# Patient Record
Sex: FEMALE | Race: WHITE | Employment: FULL TIME | URBAN - METROPOLITAN AREA
[De-identification: names, ages, dates, MRNs, and addresses within clinical notes are randomized per-mention and may not be internally consistent; named-entity substitution may affect disease eponyms.]

---

## 2017-04-05 ENCOUNTER — HOSPITAL ENCOUNTER (OUTPATIENT)
Dept: PHYSICAL THERAPY | Age: 27
Discharge: HOME | End: 2017-04-05
Payer: COMMERCIAL

## 2017-04-05 PROCEDURE — 97162 PT EVAL MOD COMPLEX 30 MIN: CPT

## 2017-04-05 PROCEDURE — 97014 ELECTRIC STIMULATION THERAPY: CPT

## 2017-04-05 PROCEDURE — 97110 THERAPEUTIC EXERCISES: CPT

## 2017-04-05 NOTE — PROGRESS NOTES
PHYSICAL THERAPY - DAILY TREATMENT NOTE    Patient Name: Earl Livingston        Date: 2017  : 1990   YES Patient  Verified  Visit #:     Insurance: Payor: Gildardo Sites / Plan: Margarita Pacheco PPO / Product Type: PPO /      In time: 8:22 Out time: 9:15   Total Treatment Time: 53     TREATMENT AREA =  Right knee pain [M25.561]  Right patellofemoral syndrome [M22.2X1]    SUBJECTIVE  Pain Level (on 0 to 10 scale):  6  / 10   Medication Changes/New allergies or changes in medical history, any new surgeries or procedures? NO    If yes, update Summary List   Subjective Functional Status/Changes:  []  No changes reported     See medical history          OBJECTIVE  Modalities Rationale:     decrease edema, decrease inflammation, decrease pain and increase tissue extensibility to improve patient's ability to walk  10 min [x] Estim, type/location: IFC/ R knee                                     []  att     [x]  unatt     []  w/US     [x]  w/ice    []  w/heat    min []  Mechanical Traction: type/lbs                   []  pro   []  sup   []  int   []  cont    []  before manual    []  after manual    min []  Ultrasound, settings/location:      min []  Iontophoresis w/ dexamethasone, location:                                               []  take home patch       []  in clinic    min []  Ice     []  Heat    location/position:     min []  Vasopneumatic Device, press/temp:     min []  Other:    [x] Skin assessment post-treatment (if applicable):    [x]  intact    []  redness- no adverse reaction     []redness - adverse reaction:          15 min Patient Education:  YES  Reviewed HEP/POC/Goals   []  Progressed/Changed HEP based on:        Other Objective/Functional Measures:    See POC     Post Treatment Pain Level (on 0 to 10) scale:   5  / 10     ASSESSMENT  Assessment/Changes in Function:     Justification for Eval Code Complexity:  Patient History : R MCL strain  Examination see exam   Clinical Presentation: evolving  Clinical Decision Making : FOTO : 26 /100       []  See Progress Note/Recertification   Patient will continue to benefit from skilled PT services to modify and progress therapeutic interventions, address functional mobility deficits, address ROM deficits, address strength deficits, analyze and address soft tissue restrictions, analyze and modify body mechanics/ergonomics and instruct in home and community integration to attain remaining goals. Progress toward goals / Updated goals:    Initial evaluation completed with home exercise program, IFC and education initiated. PLAN  [x]  Upgrade activities as tolerated YES Continue plan of care   []  Discharge due to :    []  Other:      Therapist: Gurvinder Phillips PT    Date: 4/5/2017 Time: 8:22 AM     No future appointments.

## 2017-04-05 NOTE — PROGRESS NOTES
[address] [facility]  THERAPY AT [address] [facility], [address] [facility] [address] - Phone: [phone]  Fax: [phone] / [address] [facility]  Patient Name: Daryll Carrel : 1990   Medical   Diagnosis: Right knee pain [M25.561]  Right patellofemoral syndrome [M22.2X1] Treatment Diagnosis: Right knee pain [M25.561]  Right patellofemoral syndrome [M22.2X1]   Onset Date: 2017     Referral Source: Angeles Guy MD [facility]): 2017   Prior Hospitalization: See medical history Provider #: [id number]   Prior Level of Function: No knee pain with walking or ADLs   Comorbidities: Hx R MCL strain   Medications: Verified on Patient Summary List   The Plan of Care and following information is based on the information from the initial evaluation.   ==================================================================================  Assessment / key information:  Patient is a 27 y.o. female who presents to [facility] with diagnosis of Right knee pain [M25.561]  Right patellofemoral syndrome [M22.2X1]. Patient reports onset of R patella pain 2017 when she slipped and hyperextended her knee. She reports having an MRI which was negative except for patellofemoral syndrome. Patient's pain level is constant and ranges from 4/10 to 9/10. Pain  Increases with standing and stairs, decreases with elevation and hot/cold. Pain does awaken her at night. Upon objective evaluation patient presents with impaired and painful AROM of R knee, impaired strength in knee flexion/extension and hip flexion, edema of 2 cm infrapatella, patella hypomobility  and decreased flexibility of  quad/hamstring muscles. Patient ambulates with SC outdoors, no assistive device indoors demonstrating gait deviation of antalgia. AROM of R knee as follows:  -5 to 106 degrees.   Patient scored 26 on FOTO indicating decreased function and quality of life. Functional limitations include sleeping, standing, walking, stairs and lifting/carrying her toddler. .  Patient can benefit from skilled PT to increase ROM, flexibility and strength, decrease pain and inflammation to improve overall function and quality of life.  ==================================================================================  Eval Complexity: History: MEDIUM  Complexity : 1-2 comorbidities / personal factors will impact the outcome/ POC Exam:HIGH Complexity : 4+ Standardized tests and measures addressing body structure, function, activity limitation and / or participation in recreation  Presentation: MEDIUM Complexity : Evolving with changing characteristics  Clinical Decision Making:MEDIUM Complexity : FOTO score of 26-74Overall Complexity:MEDIUM  Problem List: pain affecting function, decrease ROM, decrease strength, edema affecting function, impaired gait/ balance, decrease ADL/ functional abilitiies, decrease activity tolerance, decrease flexibility/ joint mobility. Treatment Plan may include any combination of the following: Therapeutic exercise, Therapeutic activities, Neuromuscular re-education, Physical agent/modality, Gait/balance training, Manual therapy and Patient education  Patient / Family readiness to learn indicated by: asking questions, trying to perform skills and interest  Persons(s) to be included in education: patient (P)  Barriers to Learning/Limitations: None  Measures taken:    Patient Goal (s): \"To relieve pain in my knee and walk without needing a cane. \"   Patient self reported health status: good  Rehabilitation Potential: good   Short Term Goals: To be accomplished in 3  weeks:  1) Patient performing daily home exercise program.. 2) Increase FOTO to 41 indicating improved function and quality of life. 3) Increase AROM in R knee to 0-120 degrees to facilitate walking and stairs.  Long Term Goals:  To be accomplished in  6  weeks:  1) Patient  independent with HEP. 2) Patient will improve R knee AROM to 0-130 degrees to facilitate walking and stairs. 3) Increase FOTO to 53 indicating improved function and quality of life. 4) Patient able to walk community distances without assistive device. Frequency / Duration:   Patient to be seen  2  times per week for 6  weeks:  Patient / Caregiver education and instruction: activity modification, exercises and other taping, ice  G-Codes (GP): meggan    Therapist Signature: Tammy Mehta PT Date: 3/0/3246   Certification Period: na Time: 9:20 AM   ==================================================================================  I certify that the above Physical Therapy Services are being furnished while the patient is under my care. I agree with the treatment plan and certify that this therapy is necessary. Physician Signature:        Date:       Time:     Please sign and return to [facility] or you may fax the signed copy to [phone]. Thank you.

## 2017-04-06 ENCOUNTER — HOSPITAL ENCOUNTER (OUTPATIENT)
Dept: PHYSICAL THERAPY | Age: 27
Discharge: HOME | End: 2017-04-06
Payer: COMMERCIAL

## 2017-04-06 PROCEDURE — 97014 ELECTRIC STIMULATION THERAPY: CPT

## 2017-04-06 PROCEDURE — 97110 THERAPEUTIC EXERCISES: CPT

## 2017-04-06 NOTE — PROGRESS NOTES
PHYSICAL THERAPY - DAILY TREATMENT NOTE    Patient Name: Igor Miranda        Date: 2017  : 1990   YES Patient  Verified  Visit #:   2   of   12  Insurance: Payor: Grisel Stone / Plan: Grace Ocasio PPO / Product Type: PPO /      In time: 8:51 Out time: 9:36   Total Treatment Time: 45     Medicare Time Tracking (below)   Total Timed Codes (min):  n/a 1:1 Treatment Time:  n/a     TREATMENT AREA =  Right knee pain [M25.561]  Right patellofemoral syndrome [M22.2X1]    SUBJECTIVE  Pain Level (on 0 to 10 scale):  5  / 10   Medication Changes/New allergies or changes in medical history, any new surgeries or procedures? NO    If yes, update Summary List   Subjective Functional Status/Changes:  []  No changes reported     Pt states \"im a little sore today, ill be honest i didn't do the homework she gave me bci got a flat tire and wasn't in the mood. Its a stiff sore pain today. Tj Winston been concentrating on not limping \"       OBJECTIVE  Modalities Rationale:     decrease pain to improve patient's ability to perform ADLs.     10 min [x] Estim, type/location: IFC to R knee, (Pt requested [facility], however, preferred [facility] for further f/u visits)                                    []  att     [x]  unatt     []  w/US     []  w/ice    [x]  w/heat    min []  Mechanical Traction: type/lbs                   []  pro   []  sup   []  int   []  cont    []  before manual    []  after manual    min []  Ultrasound, settings/location:      min []  Iontophoresis w/ dexamethasone, location:                                               []  take home patch       []  in clinic    min []  Ice     []  Heat    location/position:     min []  Vasopneumatic Device, press/temp:     min []  Other:    [x] Skin assessment post-treatment (if applicable):    [x]  intact    []  redness- no adverse reaction     []redness - adverse reaction:        35 min Therapeutic Exercise:  [x]  See flow sheet   Rationale:      increase ROM and increase strength to improve the patients ability to tolerate prolonged standing and walking     min Patient Education:  YES  Reviewed HEP   []  Progressed/Changed HEP based on: Other Objective/Functional Measures:    Reports R knee clicking with release of QS, increased towel roll underneath which improved sx. R Knee ext AROM = -7deg     Post Treatment Pain Level (on 0 to 10) scale:   5  / 10     ASSESSMENT  Assessment/Changes in Function:     Presented with antalgic gait pattern and decreased R knee extension upon heel strike with ambulation. Demonstrated palpable clicking with QS and SAQ. Demonstrates increased R knee sx without towel when straightening R knee. Updated and issued current HEP, however, instructed patient to not perform hamstring stretch at home secondary to incr R knee sx with stretch. []  See Progress Note/Recertification   Patient will continue to benefit from skilled PT services to modify and progress therapeutic interventions, address functional mobility deficits, address ROM deficits, address strength deficits, analyze and address soft tissue restrictions, analyze and cue movement patterns, analyze and modify body mechanics/ergonomics, assess and modify postural abnormalities, address imbalance/dizziness and instruct in home and community integration to attain remaining goals. Progress toward goals / Updated goals:    First visit after initial evaluation. Progress tx per POC.         PLAN  [x]  Upgrade activities as tolerated YES Continue plan of care   []  Discharge due to :    []  Other:      Therapist: Misty Diaz    Date: 4/6/2017 Time: 8:51 AM     Future Appointments  Date Time Provider Lazaro Powers   4/6/2017 9:00 AM Pura Crespo [address] [place] [address]   4/11/2017 3:30 PM Radha Melara, PT [address]   4/13/2017 9:00 AM Rajinder De Leon PTA [facility]   4/17/2017 8:00 AM [facility]   4/21/2017 9:00 AM Rajinder De Leon PTA [address]

## 2017-04-10 NOTE — PROGRESS NOTES
PHYSICAL THERAPY - DAILY TREATMENT NOTE    Patient Name: Deni Quezada        Date: 2017  : 1990   YES Patient  Verified  Visit #:   3   of   12  Insurance: Payor: Juan Antonio Law / Plan: Damaris Fitting PPO / Product Type: PPO /      In time: 7:55am Out time: 8:46am   Total Treatment Time: 51     Medicare Time Tracking (below)   Total Timed Codes (min):  n/a 1:1 Treatment Time:  n/a     TREATMENT AREA =  Right knee pain [M25.561]  Right patellofemoral syndrome [M22.2X1]    SUBJECTIVE  Pain Level (on 0 to 10 scale):  4  / 10   Medication Changes/New allergies or changes in medical history, any new surgeries or procedures? NO    If yes, update Summary List   Subjective Functional Status/Changes:  []  No changes reported     \"I was a little sore after i left last time, I might of pushed it a little too hard yesterday i cleaned the house a lot, I'm not limping as much when im walking around \"         OBJECTIVE  Modalities Rationale:     decrease pain to improve patient's ability to perform ADLs.     10 min [x] Estim, type/location: IFC to R knee                                    []  att     [x]  unatt     []  w/US     [x]  w/ice    []  w/heat    min []  Mechanical Traction: type/lbs                   []  pro   []  sup   []  int   []  cont    []  before manual    []  after manual    min []  Ultrasound, settings/location:      min []  Iontophoresis w/ dexamethasone, location:                                               []  take home patch       []  in clinic    min []  Ice     []  Heat    location/position:     min []  Vasopneumatic Device, press/temp:     min []  Other:    [x] Skin assessment post-treatment (if applicable):    [x]  intact    []  redness- no adverse reaction     []redness - adverse reaction:        41 min Therapeutic Exercise:  [x]  See flow sheet   Rationale:      increase ROM and increase strength to improve the patients ability to tolerate prolonged standing and walking     min Patient Education:  YES  Reviewed HEP   []  Progressed/Changed HEP based on: Other Objective/Functional Measures: Added Recumbent bike warm up, reported slight discomfort at onset, however decreased distance btw seat and bike which relieved soreness. Reported N/T sx down posterior R LE with long sit ham stretch, thus, added figure 4 stretch in order to address piriformis tightness  Added step ups and TKE (with ball)  R knee AROM = 0-138 deg     Post Treatment Pain Level (on 0 to 10) scale:   3  / 10     ASSESSMENT  Assessment/Changes in Function:     Added step ups in order to facilitate ease with navigating stairs in tri level home. Demonstrated decreases in R quad strength with step down from step up, demonstrating slight shakiness. Presents with R knee AROM WNL, however, cont with slight discomfort at end ranges. []  See Progress Note/Recertification   Patient will continue to benefit from skilled PT services to modify and progress therapeutic interventions, address functional mobility deficits, address ROM deficits, address strength deficits, analyze and address soft tissue restrictions, analyze and cue movement patterns, analyze and modify body mechanics/ergonomics, assess and modify postural abnormalities, address imbalance/dizziness and instruct in home and community integration to attain remaining goals. Progress toward goals / Updated goals:    Progressing towards STG 2. STGs 1 and 3 met.        PLAN  [x]  Upgrade activities as tolerated YES Continue plan of care   []  Discharge due to :    []  Other:      Therapist: Gen Holley    Date: 4/11/2017 Time: 1:21 PM     Future Appointments  Date Time Provider Lazaro Powers   4/11/2017 8:00 AM Gen Holley [facility]   4/13/2017 9:00 AM Sridhar Freire PTA [facility]   4/17/2017 8:00 AM Sridhar Freire PTA [facility]   4/21/2017 9:00 AM Sridhar Freire PTA [facility]

## 2017-04-11 ENCOUNTER — HOSPITAL ENCOUNTER (OUTPATIENT)
Dept: PHYSICAL THERAPY | Age: 27
Discharge: HOME | End: 2017-04-11
Payer: COMMERCIAL

## 2017-04-11 PROCEDURE — 97014 ELECTRIC STIMULATION THERAPY: CPT

## 2017-04-11 PROCEDURE — 97110 THERAPEUTIC EXERCISES: CPT

## 2017-04-13 ENCOUNTER — HOSPITAL ENCOUNTER (OUTPATIENT)
Dept: PHYSICAL THERAPY | Age: 27
Discharge: HOME | End: 2017-04-13
Payer: COMMERCIAL

## 2017-04-13 PROCEDURE — 97014 ELECTRIC STIMULATION THERAPY: CPT

## 2017-04-13 PROCEDURE — 97110 THERAPEUTIC EXERCISES: CPT

## 2017-04-13 NOTE — PROGRESS NOTES
PHYSICAL THERAPY - DAILY TREATMENT NOTE    Patient Name: Cony Keenan        Date: 2017  : 1990   YES Patient  Verified  Visit #:     Insurance: Payor: Ramon Martínez / Plan: Gina Lees PPO / Product Type: PPO /      In time: 8:57 am Out time: 9:56   Total Treatment Time: 59     TREATMENT AREA =  Right knee pain [M25.561]  Right patellofemoral syndrome [M22.2X1]    SUBJECTIVE  Pain Level (on 0 to 10 scale): 6  / 10   Medication Changes/New allergies or changes in medical history, any new surgeries or procedures? NO    If yes, update Summary List   Subjective Functional Status/Changes:  []  No changes reported     \"Right now I'm hovering around 6. We've been doing a lot with the kids this week. \"  Pain constant Tuesday. \"It was just sore yesterday. \" pt reports pain is not constant. Pt reports using patella strap while walking at [facility] and it seemed to help.      OBJECTIVE  Modalities Rationale:     decrease edema, decrease inflammation, decrease pain and increase tissue extensibility to improve patient's ability to perform prolonged standing, walking  10 min [x] Estim, type/location: IFC R knee                                     []  att     [x]  unatt     []  w/US     [x]  w/ice    []  w/heat    min []  Mechanical Traction: type/lbs                   []  pro   []  sup   []  int   []  cont    []  before manual    []  after manual    min []  Ultrasound, settings/location:      min []  Iontophoresis w/ dexamethasone, location:                                               []  take home patch       []  in clinic    min []  Ice     []  Heat    location/position:     min []  Vasopneumatic Device, press/temp:     min []  Other:    [x] Skin assessment post-treatment (if applicable):    [x]  intact    []  redness- no adverse reaction     []redness - adverse reaction:        49 min Therapeutic Exercise:  [x]  See flow sheet   Rationale:      increase ROM and increase strength to improve the patients ability to  perform prolonged standing, walking       min Patient Education:  YES  Reviewed HEP   []  Progressed/Changed HEP based on: Other Objective/Functional Measures: Add supine SLR ABD,ADD, EXT  Updated written HEP     Post Treatment Pain Level (on 0 to 10) scale:  6  / 10     ASSESSMENT  Assessment/Changes in Function:   Pt reported tingling in R foot with long sitting hamstring stretch. Hold bike, standing ex progression secondary pt in flip flops today. Add estim with ice secondary to increased pain      []  See Progress Note/Recertification   Patient will continue to benefit from skilled PT services to modify and progress therapeutic interventions, address functional mobility deficits, address ROM deficits, address strength deficits, analyze and address soft tissue restrictions and instruct in home and community integration to attain remaining goals. Progress toward goals / Updated goals:    1) Patient performing daily home exercise program.. 2) Increase FOTO to 41 indicating improved function and quality of life.   3) Increase AROM in R knee to 0-120 degrees to facilitate walking and stairs     PLAN  []  Upgrade activities as tolerated YES Continue plan of care   []  Discharge due to :    []  Other:      Therapist: Rajinder De Leon PTA    Date: 4/13/2017 Time: 9:56  AM     Future Appointments  Date Time Provider Lazaro Powers   4/13/2017 9:00 AM Rajinder De Leon PTA [facility]   4/17/2017 8:00 AM Rajinder De Leon PTA [facility]   4/21/2017 9:00 AM Rajinder De Leon PTA [facility]   4/26/2017 7:30 AM Rajinder De Leon PTA [facility]   4/28/2017 8:00 AM Pura [address] [place] [address]

## 2017-04-17 ENCOUNTER — HOSPITAL ENCOUNTER (OUTPATIENT)
Dept: PHYSICAL THERAPY | Age: 27
Discharge: HOME | End: 2017-04-17
Payer: COMMERCIAL

## 2017-04-17 PROCEDURE — 97014 ELECTRIC STIMULATION THERAPY: CPT

## 2017-04-17 PROCEDURE — 97110 THERAPEUTIC EXERCISES: CPT

## 2017-04-17 NOTE — PROGRESS NOTES
PHYSICAL THERAPY - DAILY TREATMENT NOTE    Patient Name: Lisa Cope        Date: 2017  : 1990   YES Patient  Verified  Visit #:     Insurance: Payor: Adam Vitale / Plan: Conrado Mcbride PPO / Product Type: PPO /      In time: 8:09 am Out time: 9:07 am   Total Treatment Time: 58         TREATMENT AREA =  Right knee pain [M25.561]  Right patellofemoral syndrome [M22.2X1]    SUBJECTIVE  Pain Level (on 0 to 10 scale):  3  / 10   Medication Changes/New allergies or changes in medical history, any new surgeries or procedures? NO    If yes, update Summary List   Subjective Functional Status/Changes:  []  No changes reported   Pt reports she feels mostly stiff. \"I didn't do any ex over the weekend. \"          OBJECTIVE  Modalities Rationale:     decrease edema, decrease inflammation, decrease pain and increase tissue extensibility to improve patient's ability to perform standing, stairs  10 min [x] Estim, type/location: IFC ice                                     []  att     [x]  unatt     []  w/US     [x]  w/ice    []  w/heat    min []  Mechanical Traction: type/lbs                   []  pro   []  sup   []  int   []  cont    []  before manual    []  after manual    min []  Ultrasound, settings/location:      min []  Iontophoresis w/ dexamethasone, location:                                               []  take home patch       []  in clinic    min []  Ice     []  Heat    location/position:     min []  Vasopneumatic Device, press/temp:     min []  Other:    [x] Skin assessment post-treatment (if applicable):    [x]  intact    []  redness- no adverse reaction     []redness - adverse reaction:        42 min Therapeutic Exercise:  [x]  See flow sheet   Rationale:      increase ROM and increase strength to improve the patients ability to  perform standing, stairs    6 min Manual Therapy: KT taping Y strip @ 25% to anterior knee       min Patient Education:  YES  Reviewed HEP   []  Progressed/Changed HEP based on: Other Objective/Functional Measures:  Pt reported \"clicking in knee with release from TKE. Pt reported increased pain after total gym. Pt self progressed reps with TG. [place] KT taping for pain release     Post Treatment Pain Level (on 0 to 10) scale:   5-6  / 10     ASSESSMENT  Assessment/Changes in Function:   Pt reports slight increase in pain to 6/10 after total gym. Pt education to exercise within tolerable ranges. Emphasized regular performance of HEP for continued progression toward all goals for strength, functional ability. Trial KT taping to decrease pain in walking, stairs. Pt education in KT taping , skin monitoring and wear time as tolerated. []  See Progress Note/Recertification   Patient will continue to benefit from skilled PT services to modify and progress therapeutic interventions, address functional mobility deficits, address ROM deficits, address strength deficits and instruct in home and community integration to attain remaining goals.    Progress toward goals / Updated goals:    Assess tolerance to KT taping     PLAN  []  Upgrade activities as tolerated YES Continue plan of care   []  Discharge due to :    []  Other:      Therapist: Ayan Hamlin PTA    Date: 4/17/2017 Time: 9:07  AM     Future Appointments  Date Time Provider Lazaro Powers   4/21/2017 9:00 AM Ayan Hamlin PTA [facility]   4/26/2017 7:30 AM Ayan Hamlin PTA [facility]   4/28/2017 8:00 AM Pura [address] [place] [address]

## 2017-04-21 ENCOUNTER — HOSPITAL ENCOUNTER (OUTPATIENT)
Dept: PHYSICAL THERAPY | Age: 27
Discharge: HOME | End: 2017-04-21
Payer: COMMERCIAL

## 2017-04-21 PROCEDURE — 97110 THERAPEUTIC EXERCISES: CPT

## 2017-04-21 PROCEDURE — 97140 MANUAL THERAPY 1/> REGIONS: CPT

## 2017-04-21 PROCEDURE — 97014 ELECTRIC STIMULATION THERAPY: CPT

## 2017-04-26 ENCOUNTER — APPOINTMENT (OUTPATIENT)
Dept: PHYSICAL THERAPY | Age: 27
End: 2017-04-26
Payer: COMMERCIAL

## 2017-04-28 ENCOUNTER — HOSPITAL ENCOUNTER (OUTPATIENT)
Dept: PHYSICAL THERAPY | Age: 27
Discharge: HOME | End: 2017-04-28
Payer: COMMERCIAL

## 2017-04-28 PROCEDURE — 97110 THERAPEUTIC EXERCISES: CPT

## 2017-04-28 PROCEDURE — 97016 VASOPNEUMATIC DEVICE THERAPY: CPT

## 2017-04-28 NOTE — PROGRESS NOTES
PHYSICAL THERAPY - DAILY TREATMENT NOTE    Patient Name: Miriam Osullivan        Date: 2017  : 1990   YES Patient  Verified  Visit #:     Insurance: Payor: Ginette Spann / Plan: Alessandra Rincon PPO / Product Type: PPO /      In time: 8:11am Out time: 9:10am   Total Treatment Time: 59     Medicare Time Tracking (below)   Total Timed Codes (min):  49 1:1 Treatment Time:  23     TREATMENT AREA =  Right knee pain [M25.561]  Right patellofemoral syndrome [M22.2X1]    SUBJECTIVE  Pain Level (on 0 to 10 scale):  5  / 10   Medication Changes/New allergies or changes in medical history, any new surgeries or procedures? NO    If yes, update Summary List   Subjective Functional Status/Changes:  []  No changes reported     Dr Debi Salazar put me back on full duty, zachary been doing more than i have been so i might be a little more sore. The pain moved to outside R of my knee and its not pain but feels like it needs to pop          OBJECTIVE  Modalities Rationale:     decrease pain to improve patient's ability to perform ADLs.      min [] Estim, type/location:                                      []  att     []  unatt     []  w/US     []  w/ice    []  w/heat    min []  Mechanical Traction: type/lbs                   []  pro   []  sup   []  int   []  cont    []  before manual    []  after manual    min []  Ultrasound, settings/location:      min []  Iontophoresis w/ dexamethasone, location:                                               []  take home patch       []  in clinic    min []  Ice     []  Heat    location/position:    10 min [x]  Vasopneumatic Device, press/temp: Low pressure @38 deg    min []  Other:    [x] Skin assessment post-treatment (if applicable):    [x]  intact    []  redness- no adverse reaction     []redness - adverse reaction:        49 (bill 23) min Therapeutic Exercise:  [x]  See flow sheet   Rationale:      increase ROM and increase strength to improve the patients ability to tolerate prolonged standing and walking     min Patient Education:  YES  Reviewed HEP   []  Progressed/Changed HEP based on: Other Objective/Functional Measures: Added 3 way hip kicks standing on L LE. Progressed mini squats on TG to full WBing mini squats. Post Treatment Pain Level (on 0 to 10) scale:   6  / 10     ASSESSMENT  Assessment/Changes in Function:     Progressed standing strengthening as appropriate with good tolerance. Demonstrated slight trunk compensations with standing hip kicks, and required VCing for proper form. []  See Progress Note/Recertification   Patient will continue to benefit from skilled PT services to modify and progress therapeutic interventions, address functional mobility deficits, address ROM deficits, address strength deficits, analyze and address soft tissue restrictions, analyze and cue movement patterns, analyze and modify body mechanics/ergonomics, assess and modify postural abnormalities, address imbalance/dizziness and instruct in home and community integration to attain remaining goals. Progress toward goals / Updated goals:    Progressing towards all STGs.       PLAN  [x]  Upgrade activities as tolerated YES Continue plan of care   []  Discharge due to :    []  Other:      Therapist: Hair Gomes    Date: 4/28/2017 Time: 7:35 AM     Future Appointments  Date Time Provider Lazaro Powers   4/28/2017 8:00 AM [address]

## 2017-05-01 ENCOUNTER — HOSPITAL ENCOUNTER (OUTPATIENT)
Dept: PHYSICAL THERAPY | Age: 27
Discharge: HOME | End: 2017-05-01
Payer: COMMERCIAL

## 2017-05-01 PROCEDURE — 97110 THERAPEUTIC EXERCISES: CPT

## 2017-05-01 PROCEDURE — 97140 MANUAL THERAPY 1/> REGIONS: CPT

## 2017-05-01 PROCEDURE — 97016 VASOPNEUMATIC DEVICE THERAPY: CPT

## 2017-05-01 NOTE — PROGRESS NOTES
PHYSICAL THERAPY - DAILY TREATMENT NOTE    Patient Name: Zay Nj        Date: 2017  : 1990   YES Patient  Verified  Visit #:     Insurance: Payor: Kianna Henriquez / Plan: Rikki Serna PPO / Product Type: PPO /      In time: 9:32 am Out time: 10:35 am   Total Treatment Time: 63     TREATMENT AREA =  Right knee pain [M25.561]  Right patellofemoral syndrome [M22.2X1]    SUBJECTIVE  Pain Level (on 0 to 10 scale):  6 / 10   Medication Changes/New allergies or changes in medical history, any new surgeries or procedures? NO    If yes, update Summary List   Subjective Functional Status/Changes:  []  No changes reported   Pt reports she just returning to full duty @ work last week and is doing more lifting. Pt reports stairs are not a problem and when she is not tired she is able to carry her daughter up the stairs now.          OBJECTIVE  Modalities Rationale:     decrease edema, decrease inflammation, decrease pain and increase tissue extensibility to improve patient's ability to perform lifting, prolonged walking   min [] Estim, type/location:                                      []  att     []  unatt     []  w/US     []  w/ice    []  w/heat    min []  Mechanical Traction: type/lbs                   []  pro   []  sup   []  int   []  cont    []  before manual    []  after manual    min []  Ultrasound, settings/location:      min []  Iontophoresis w/ dexamethasone, location:                                               []  take home patch       []  in clinic    min []  Ice     []  Heat    location/position:    10 min []  Vasopneumatic Device, press/temp: Light pressure/42 degrees    min []  Other:    [x] Skin assessment post-treatment (if applicable):    [x]  intact    []  redness- no adverse reaction     []redness - adverse reaction:        47 min Therapeutic Exercise:  [x]  See flow sheet   Rationale:      increase ROM and increase strength to improve the patients ability to perform lifting, prolonged walking    6 min Manual Therapy: KT taping Y strip @ 25-50% to anterior knee   Rationale:      decrease pain, increase ROM and increase tissue extensibility to improve patient's ability toperform lifting, prolonged walking          min Patient Education:  YES  Reviewed HEP   []  Progressed/Changed HEP based on: Other Objective/Functional Measures:    Increased bike x 7 min  Resume KT taping  AROM: R +3 to 157 degrees; L: +5 to 157 degrees   Post Treatment Pain Level (on 0 to 10) scale:   0  / 10     ASSESSMENT  Assessment/Changes in Function:   Pt demonstrating decreasing fatigue with supine SLR 4 way hip strengthening, good decrease in pain after session. Resume KT taping to anterior knee for PF pain. []  See Progress Note/Recertification   Patient will continue to benefit from skilled PT services to modify and progress therapeutic interventions, address functional mobility deficits, address ROM deficits, address strength deficits, analyze and address soft tissue restrictions and instruct in home and community integration to attain remaining goals. Progress toward goals / Updated goals:    Reassess for progress note next visit.      PLAN  []  Upgrade activities as tolerated YES Continue plan of care   []  Discharge due to :    []  Other:      Therapist: Med Priest PTA    Date: 5/1/2017 Time: 10:35  AM     Future Appointments  Date Time Provider Lazaro Powers   5/1/2017 9:30 AM Med Priest PTA [facility]   5/4/2017 9:30 AM Med Priest PTA [facility]   5/8/2017 9:00 AM Med Priest PTA [facility]   5/12/2017 8:[facility]

## 2017-05-04 ENCOUNTER — HOSPITAL ENCOUNTER (OUTPATIENT)
Dept: PHYSICAL THERAPY | Age: 27
Discharge: HOME | End: 2017-05-04
Payer: COMMERCIAL

## 2017-05-04 PROCEDURE — 97110 THERAPEUTIC EXERCISES: CPT

## 2017-05-04 PROCEDURE — 97140 MANUAL THERAPY 1/> REGIONS: CPT

## 2017-05-04 NOTE — PROGRESS NOTES
PHYSICAL THERAPY - DAILY TREATMENT NOTE    Patient Name: Zay Nj        Date: 2017  : 1990   YES Patient  Verified  Visit #:     Insurance: Payor: Kianna Henriquez / Plan: Rikki Serna PPO / Product Type: PPO /      In time: 9:30 am Out time: 10:33    Total Treatment Time: 63     TREATMENT AREA =  Right knee pain [M25.561]  Right patellofemoral syndrome [M22.2X1]    SUBJECTIVE  Pain Level (on 0 to 10 scale): 3- 10   Medication Changes/New allergies or changes in medical history, any new surgeries or procedures? NO    If yes, update Summary List   Subjective Functional Status/Changes:  []  No changes reported     \"It feels like my knee just needs to pop and it will feel better. \"   Pt reports good support with use of KT taping         OBJECTIVE  Modalities Rationale:     decrease edema, decrease inflammation and decrease pain to improve patient's ability to perform prolonged standing, walking   min [] Estim, type/location:                                      []  att     []  unatt     []  w/US     []  w/ice    []  w/heat    min []  Mechanical Traction: type/lbs                   []  pro   []  sup   []  int   []  cont    []  before manual    []  after manual    min []  Ultrasound, settings/location:      min []  Iontophoresis w/ dexamethasone, location:                                               []  take home patch       []  in clinic   10 min [x]  Ice     []  Heat    location/position: LE elevated R knee    min []  Vasopneumatic Device, press/temp:     min []  Other:    [x] Skin assessment post-treatment (if applicable):    [x]  intact    []  redness- no adverse reaction     []redness - adverse reaction:        41 min Therapeutic Exercise:  [x]  See flow sheet   Rationale:      increase ROM and increase strength to improve the patients ability to  perform prolonged standing, walking       12 min Manual Therapy: CFM to suprapatellar region, distal quad, ITB   Rationale:      decrease pain, increase ROM and increase tissue extensibility to improve patient's ability to perform prolonged standing, walking         min Patient Education:  YES  Reviewed HEP   []  Progressed/Changed HEP based on: Other Objective/Functional Measures:    VASO unavailable this session. Resume prn     Post Treatment Pain Level (on 0 to 10) scale:   0 / 10     ASSESSMENT  Assessment/Changes in Function:     See progress note     [x]  See Progress Note/Recertification   Patient will continue to benefit from skilled PT services to modify and progress therapeutic interventions, address functional mobility deficits, address ROM deficits, address strength deficits and instruct in home and community integration to attain remaining goals.    Progress toward goals / Updated goals:    See progress note     PLAN  []  Upgrade activities as tolerated YES Continue plan of care   []  Discharge due to :    []  Other:      Therapist: Bing Johnson PTA    Date: 5/4/2017 Time: 10:33  AM     Future Appointments  Date Time Provider Lazaro Powers   5/4/2017 9:30 AM Bing Johnson PTA [facility]   5/8/2017 9:00 AM Bing Johnson PTA [facility]   5/12/2017 8:00 AM [facility] [address] [place] [address]

## 2017-05-04 NOTE — PROGRESS NOTES
[facility] [address], [address] [facility], [place] [address] - Phone: [phone]  Fax: [phone]  PROGRESS NOTE  Patient Name: Igor Miranda : 1990   Treatment/Medical Diagnosis: Right knee pain [M25.561]  Right patellofemoral syndrome [M22.2X1]   Referral Source: Cayden Pickard MD     Date of Initial Visit: 17 Attended Visits: 9 Missed Visits: 0     SUMMARY OF TREATMENT  Physical therapy treatment has consisted of Therapeutic exercise for LE strengthening, ROM, proprioception exercise, Manual Therapy-Kenisotaping, Vasoneumatic ice, and HEP. CURRENT STATUS  Pt reports pain range: 1 to 3/10. Pt presents with TrPt tenderness suprapatellar region, distal lateral Quad/distal ITB. Pt reports functional improvements: carrying daughter up stairs, sleeping, decreasing pain with activities  Functional limitations: prolonged standing, walking, lifting  Pt would benefit from continued skilled PT intervention to continue to increase  flexibility and strength, decrease pain and inflammation to improve overall function and quality of life.  ==================================================================================      Goal/Measure of Progress Goal Met? 1. Patient performing daily home exercise program..   Status at last Eval: na Current Status: Pt instructed in initial HEP. yes   2. Increase FOTO to 41 indicating improved function and quality of life. Status at last Eval: 26 Current Status: 70 yes   3. Increase AROM in R knee to 0-120 degrees to facilitate walking and stairs. Status at last Eval: -5 to 106 degrees Current Status: +5 to 159 degrees yes     New Goals to be achieved in _2_  weeks:  1. Patient independent with HEP. 2.  Patient to increase R hip flexion, knee extension and flexion to 5/5 to facilitate normal gait. 3.  Patient to score  +5 on GROC indicating symptoms a good deal better. RECOMMENDATIONS  Plan to continue 1-2x per week x 1-2 weeks  If you have any questions/comments please contact us directly at  [phone]. Thank you for allowing us to assist in the care of your patient. LPTA Signature: Thi Aponte PTA  Date: 5/4/2017   PT Signature:  Time: 9:27 AM   NOTE TO PHYSICIAN:  PLEASE COMPLETE THE ORDERS BELOW AND FAX TO   [facility]: [phone]. If you are unable to process this request in 24 hours please contact our office:  [phone].    ___ I have read the above report and request that my patient continue as recommended.   ___ I have read the above report and request that my patient continue therapy with the following changes/special instructions:_________________________________________________________   ___ I have read the above report and request that my patient be discharged from therapy.      Physician Signature:        Date:       Time:

## 2017-05-08 ENCOUNTER — HOSPITAL ENCOUNTER (OUTPATIENT)
Dept: PHYSICAL THERAPY | Age: 27
Discharge: HOME | End: 2017-05-08
Payer: COMMERCIAL

## 2017-05-08 PROCEDURE — 97140 MANUAL THERAPY 1/> REGIONS: CPT

## 2017-05-08 PROCEDURE — 97110 THERAPEUTIC EXERCISES: CPT

## 2017-05-08 NOTE — PROGRESS NOTES
PHYSICAL THERAPY - DAILY TREATMENT NOTE    Patient Name: Drew Tucker        Date: 2017  : 1990   YES Patient  Verified  Visit #:   10   of   12  Insurance: Payor: Evalene Severin / Plan: Patti Cabezas PPO / Product Type: PPO /      In time: 9:22 am Out time: 10:19 am   Total Treatment Time: 57     TREATMENT AREA =  Right knee pain [M25.561]  Right patellofemoral syndrome [M22.2X1]    SUBJECTIVE  Pain Level (on 0 to 10 scale): 2-3  / 10   Medication Changes/New allergies or changes in medical history, any new surgeries or procedures? NO    If yes, update Summary List   Subjective Functional Status/Changes:  []  No changes reported     Pt reports Saturday she felt good until she reached out to catch a small child falling off a step and did a small lunge to catch him. Soreness in distal lateral knee through the rest of the day.  Pain releif         OBJECTIVE  Modalities Rationale:     decrease edema, decrease inflammation, decrease pain and increase tissue extensibility to improve patient's ability to perform bending, lifting   min [] Estim, type/location:                                      []  att     []  unatt     []  w/US     []  w/ice    []  w/heat    min []  Mechanical Traction: type/lbs                   []  pro   []  sup   []  int   []  cont    []  before manual    []  after manual    min []  Ultrasound, settings/location:      min []  Iontophoresis w/ dexamethasone, location:                                               []  take home patch       []  in clinic   10 min [x]  Ice     []  Heat    location/position: Supine LE elevated    min []  Vasopneumatic Device, press/temp:     min []  Other:    [x] Skin assessment post-treatment (if applicable):    [x]  intact    []  redness- no adverse reaction     []redness - adverse reaction:        39 min Therapeutic Exercise:  [x]  See flow sheet   Rationale:      increase ROM and increase strength to improve the patients ability to perform prolonged standing, walking    8 min Manual Therapy: CFM to distal supra patellar quad tendon, distal Lateral quad,/ITB STM   Rationale:      decrease pain, increase ROM, increase tissue extensibility and decrease trigger points to improve patient's ability to perform prolonged standing, walking     min Patient Education:  YES  Reviewed HEP   [x]  Progressed/Changed HEP based on:  Decreasing fatigue with exercise     Other Objective/Functional Measures:    Increase bike x 8 min, add 4 inch step ups, resume 1 # SAQ, increased standing ham curls 2# 10 reps     Post Treatment Pain Level (on 0 to 10) scale:   4-5  / 10     ASSESSMENT  Assessment/Changes in Function:   TrPt tenderness distal lateral Quad tendon region. Pt reported mild increase in sx after manual release in R lateral Quad tendon region. Pt demonstrating good tolerance to standing LE strengthening ex  VCs for proper exercise technique with SAQ, HR-double up, S HR with eccentric lowering    []  See Progress Note/Recertification   Patient will continue to benefit from skilled PT services to modify and progress therapeutic interventions, address functional mobility deficits, address ROM deficits, address strength deficits, analyze and address soft tissue restrictions and instruct in home and community integration to attain remaining goals. Progress toward goals / Updated goals:    1. Patient independent with HEP. -on going   2. Patient to increase R hip flexion, knee extension and flexion to 5/5 to facilitate normal gait. -on going   3. Patient to score +5 on GROC indicating symptoms a good deal better.           PLAN  []  Upgrade activities as tolerated YES Continue plan of care   []  Discharge due to :    []  Other:      Therapist: Maya Stanford PTA    Date: 5/8/2017 Time: 10:19  AM     Future Appointments  Date Time Provider Lazaro Powers   5/12/2017 8:00 AM [address]

## 2017-05-11 NOTE — PROGRESS NOTES
PHYSICAL THERAPY - DAILY TREATMENT NOTE    Patient Name: Lisa Cope        Date: 2017  : 1990   YES Patient  Verified  Visit #:   (11) 1   of   4  Insurance: Payor: Adma Vitale / Plan: Conrado Mcbride PPO / Product Type: PPO /      In time: 8:07am Out time: 9:06am   Total Treatment Time: 59     Medicare Time Tracking (below)   Total Timed Codes (min):  n/a 1:1 Treatment Time:  n/a     TREATMENT AREA =  Right knee pain [M25.561]  Right patellofemoral syndrome [M22.2X1]    SUBJECTIVE  Pain Level (on 0 to 10 scale):  2  / 10   Medication Changes/New allergies or changes in medical history, any new surgeries or procedures? NO    If yes, update Summary List   Subjective Functional Status/Changes:  []  No changes reported     Even squatting now isn't such a big deal, I finding myself in a deep squat and its fine. The last 2 weeks nothings has really been bothering me          OBJECTIVE  Modalities Rationale:     decrease pain to improve patient's ability to perform ADLs.      min [] Estim, type/location:                                      []  att     []  unatt     []  w/US     []  w/ice    []  w/heat    min []  Mechanical Traction: type/lbs                   []  pro   []  sup   []  int   []  cont    []  before manual    []  after manual    min []  Ultrasound, settings/location:      min []  Iontophoresis w/ dexamethasone, location:                                               []  take home patch       []  in clinic   10 min [x]  Ice     []  Heat    location/position: R knee supine    min []  Vasopneumatic Device, press/temp:     min []  Other:    [x] Skin assessment post-treatment (if applicable):    [x]  intact    []  redness- no adverse reaction     []redness - adverse reaction:        41 min Therapeutic Exercise:  [x]  See flow sheet   Rationale:      increase ROM and increase strength to improve the patients ability to tolerate prolonged standing and walking    8 min Manual Therapy: Strumming to distal supra patellar quad tendon, distal Lateral quad   Rationale: decrease pain, increase ROM, increase tissue extensibility and decrease trigger points to improve patient's ability to perform prolonged standing, walking      min Patient Education:  YES  Reviewed HEP   []  Progressed/Changed HEP based on: Other Objective/Functional Measures: Added tap downs. Progressed 4 way hip in standing to GTB. Progressed SAQ to LAQ. Increased 4 way hip in supine to 1.5 lb weight     Post Treatment Pain Level (on 0 to 10) scale:   0  / 10     ASSESSMENT  Assessment/Changes in Function:     Demonstrated weight shift to L LE with squatting. Demonstrated increased difficulty with SLS on foam however able to maintain balance. Presented with quadriceps shaking and weakness/ decreased quad control with tap down. Progressing towards D/C to home mgt of sx     []  See Progress Note/Recertification   Patient will continue to benefit from skilled PT services to modify and progress therapeutic interventions, address functional mobility deficits, address ROM deficits, address strength deficits, analyze and address soft tissue restrictions, analyze and cue movement patterns, analyze and modify body mechanics/ergonomics, assess and modify postural abnormalities, address imbalance/dizziness and instruct in home and community integration to attain remaining goals. Progress toward goals / Updated goals:    Progressing towards all LTGs.       PLAN  [x]  Upgrade activities as tolerated YES Continue plan of care   []  Discharge due to :    []  Other:      Therapist: Yoni Leigh    Date: 5/12/2017 Time: 11:46 AM     Future Appointments  Date Time Provider Lazaro Powers   5/12/2017 8:00 AM [address]

## 2017-05-12 ENCOUNTER — HOSPITAL ENCOUNTER (OUTPATIENT)
Dept: PHYSICAL THERAPY | Age: 27
Discharge: HOME | End: 2017-05-12
Payer: COMMERCIAL

## 2017-05-12 PROCEDURE — 97140 MANUAL THERAPY 1/> REGIONS: CPT

## 2017-05-12 PROCEDURE — 97110 THERAPEUTIC EXERCISES: CPT

## 2017-05-17 ENCOUNTER — HOSPITAL ENCOUNTER (OUTPATIENT)
Dept: PHYSICAL THERAPY | Age: 27
Discharge: HOME | End: 2017-05-17
Payer: COMMERCIAL

## 2017-05-17 PROCEDURE — 97110 THERAPEUTIC EXERCISES: CPT

## 2017-05-17 PROCEDURE — 97140 MANUAL THERAPY 1/> REGIONS: CPT

## 2017-05-24 NOTE — PROGRESS NOTES
[facility] AT [address] [place]  Phone: [phone]  Fax: [phone] SUMMARY  Patient Name: Daryll Carrel : 1990   Treatment/Medical Diagnosis: Right knee pain [M25.561]  Right patellofemoral syndrome [M22.2X1]   Referral Source: [place] Guy MD     Date of Initial Visit: 17 Attended Visits: 13 Missed Visits: 0     SUMMARY OF TREATMENT  Therapeutic exercises including ROM, strengthening, stretching, manual therapy including joint and soft tissue manipulation, balance training, postural re-education, modalities: ice PRN, HEP instruction. CURRENT STATUS  The pt has progressed well with therapy, consistently reporting decreased pain and increased functional ability. Pt reports 90% improvement in R knee sx since initiating PT services. Based on progress from PT services and pt reported improvement, patient is appropriate for DC to home mgt of sx at this time. Goal/Measure of Progress Goal Met? 1. Patient independent with HEP. Status at last Eval: N/a  Current Status: I with HEP yes   2. Patient to score +5 on GROC indicating symptoms a good deal better. Status at last Eval: n/a Current Status: +6 A great deal better yes   3. Patient to increase R hip flexion, knee extension and flexion to 5/5 to facilitate normal gait. Status at last Eval: R hip flexion = 4+/5  R knee ext = 4+/5  R knee flexion = 4+/5 Current Status: R hip flexion = 5-/5  R knee ext = 5-/5  R knee flexion = 5/5 yes     RECOMMENDATIONS  Discontinue therapy. Progressing towards or have reached established goals. If you have any questions/comments please contact us directly at [phone]. Thank you for allowing us to assist in the care of your patient.     Therapist Signature: Alfredo Mota Date: 2017     Time: 4:44 PM

## 2017-05-24 NOTE — PROGRESS NOTES
PHYSICAL THERAPY - DAILY TREATMENT NOTE    Patient Name: [place]ia Adorno        Date: 2017  : 1990   YES Patient  Verified  Visit #:   (39) 3   of   4  Insurance: Payor: Jesse Moody / Plan: [employer] PPO / Product Type: PPO /      In time: 8:03am Out time: 9:06am   Total Treatment Time: 63     Medicare Time Tracking (below)   Total Timed Codes (min):  n/a 1:1 Treatment Time:  n/a     TREATMENT AREA =  Right knee pain [M25.561]  Right patellofemoral syndrome [M22.2X1]    SUBJECTIVE  Pain Level (on 0 to 10 scale):  1  / 10   Medication Changes/New allergies or changes in medical history, any new surgeries or procedures? NO    If yes, update Summary List   Subjective Functional Status/Changes:  []  No changes reported     Pt states \" 90% improvement, sometimes i have a 1/10 pain\"         OBJECTIVE  Modalities Rationale:  n/a    63 min Therapeutic Exercise:  [x]  See flow sheet   Rationale:      increase ROM and increase strength to improve the patients ability to perform ADLs. min Patient Education:  YES  Reviewed HEP   []  Progressed/Changed HEP based on: Other Objective/Functional Measures:    FOTO =81     Post Treatment Pain Level (on 0 to 10) scale:   1  / 10     ASSESSMENT  Assessment/Changes in Function:     See DC.     []  See Progress Note/Recertification   Patient will continue to benefit from skilled PT services to modify and progress therapeutic interventions, address functional mobility deficits, address ROM deficits, address strength deficits, analyze and address soft tissue restrictions, analyze and cue movement patterns, analyze and modify body mechanics/ergonomics, assess and modify postural abnormalities, address imbalance/dizziness and instruct in home and community integration to attain remaining goals.    Progress toward goals / Updated goals:    See DC     PLAN  []  Upgrade activities as tolerated YES Continue plan of care   [x]  Discharge due to : Met or progressing towards all goals.    []  Other:      Therapist: Fannie Pablo    Date: 5/25/2017 Time: 4:55 PM     Future Appointments  Date Time Provider Lazaro Powers   5/25/2017 8:00 AM Fannie Leader [facility]

## 2017-05-25 ENCOUNTER — HOSPITAL ENCOUNTER (OUTPATIENT)
Dept: PHYSICAL THERAPY | Age: 27
Discharge: HOME | End: 2017-05-25
Payer: COMMERCIAL

## 2017-05-25 PROCEDURE — 97110 THERAPEUTIC EXERCISES: CPT

## 2021-03-30 ENCOUNTER — OFFICE VISIT (OUTPATIENT)
Dept: FAMILY MEDICINE CLINIC | Age: 31
End: 2021-03-30
Payer: COMMERCIAL

## 2021-03-30 VITALS
DIASTOLIC BLOOD PRESSURE: 67 MMHG | OXYGEN SATURATION: 98 % | WEIGHT: 143 LBS | SYSTOLIC BLOOD PRESSURE: 113 MMHG | RESPIRATION RATE: 17 BRPM | HEIGHT: 65 IN | BODY MASS INDEX: 23.82 KG/M2 | HEART RATE: 79 BPM | TEMPERATURE: 98.1 F

## 2021-03-30 DIAGNOSIS — E55.9 VITAMIN D DEFICIENCY: ICD-10-CM

## 2021-03-30 DIAGNOSIS — K58.2 IRRITABLE BOWEL SYNDROME WITH BOTH CONSTIPATION AND DIARRHEA: ICD-10-CM

## 2021-03-30 DIAGNOSIS — Z87.42 HX OF ABNORMAL CERVICAL PAP SMEAR: ICD-10-CM

## 2021-03-30 DIAGNOSIS — R10.13 EPIGASTRIC DISCOMFORT: Primary | ICD-10-CM

## 2021-03-30 DIAGNOSIS — A63.0 HPV (HUMAN PAPILLOMA VIRUS) ANOGENITAL INFECTION: ICD-10-CM

## 2021-03-30 DIAGNOSIS — F41.9 ANXIETY: ICD-10-CM

## 2021-03-30 DIAGNOSIS — R11.0 NAUSEA: ICD-10-CM

## 2021-03-30 DIAGNOSIS — Z97.5 IUD CONTRACEPTION: ICD-10-CM

## 2021-03-30 DIAGNOSIS — Z13.220 SCREENING CHOLESTEROL LEVEL: ICD-10-CM

## 2021-03-30 PROCEDURE — 99204 OFFICE O/P NEW MOD 45 MIN: CPT | Performed by: PHYSICIAN ASSISTANT

## 2021-03-30 RX ORDER — RANITIDINE 150 MG/1
150 TABLET, FILM COATED ORAL 2 TIMES DAILY
COMMUNITY

## 2021-03-30 RX ORDER — DEXTROAMPHETAMINE SACCHARATE, AMPHETAMINE ASPARTATE, DEXTROAMPHETAMINE SULFATE AND AMPHETAMINE SULFATE 5; 5; 5; 5 MG/1; MG/1; MG/1; MG/1
20 TABLET ORAL 3 TIMES DAILY
COMMUNITY
Start: 2021-03-04

## 2021-03-30 RX ORDER — ALPRAZOLAM 0.5 MG/1
1 TABLET ORAL
COMMUNITY
Start: 2020-11-21

## 2021-03-30 RX ORDER — CITALOPRAM 20 MG/1
TABLET, FILM COATED ORAL
COMMUNITY
Start: 2021-03-29

## 2021-03-30 RX ORDER — CLONIDINE HYDROCHLORIDE 0.2 MG/1
1-2 TABLET ORAL
COMMUNITY
Start: 2021-03-29

## 2021-03-30 RX ORDER — LAMOTRIGINE 200 MG/1
TABLET ORAL
COMMUNITY
Start: 2021-03-25

## 2021-03-30 NOTE — PROGRESS NOTES
HISTORY OF PRESENT ILLNESS  Brandt Savage is a 26 y.o. female. HPI  Pt is being seen to [facility] care with a new provider and has not been seen in a few yrs. She has a hx of bipolar and depression/anxiety and sees psych and has been doing well on meds. She has a long hx of stomach/GI issues and states she has epigastric discomfort and frequent nausea donny after eating. She has seen GI in the past but does not feel they did anything. She states they dx her with IBS but meds they gave her did not help and she never followed up. In reviewing her records she was H pylori positive in  and never took meds bc was breast feeding. Discussed with pt that H pylori could cause a lot of her symptoms and advised her on the tx. She declined testing today and states even if positive she does not want tx as she feels the abx side effects could be worse. Tried to explain to pt that without tx the H pylori would not go away and would worsen but she was adamant about not having test done and that she did not have time for any lab work today and would return later for it. SHe also has a hx of abn PAPs and has not had one in a few yrs so will refer her to gyn. She also feels the cause of her epigastric discomfort could be gyn related. Past Medical History:   Diagnosis Date    Abnormal Pap smear     ASCUS, HR HPV    ADHD     Anemia during pregnancy 3/6/2015    Anxiety     B[facility] 2 [facility] [address]     Depression     FH: Fernando-Parkinson-White syndrome 2015    H/O postpartum depression, currently pregnant 2014    H/O:  2014    For failed IOL, 9 lbs    LGA (large for gestational age) fetus affecting mother, delivered 2014    LGSIL (low grade squamous intraepithelial dysplasia) 2014    Seizures ([address]     last sz 5 yrs ago.   neurologist \"couldn't figure out etx or type\" antizx meds made it worse    Unspecified epilepsy without mention of intractable epilepsy      Past Surgical History:   Procedure Laterality Date    HX APPENDECTOMY      HX CHOLECYSTECTOMY      VT  DELIVERY ONLY       Social History     Socioeconomic History    Marital status:      Spouse name: Not on file    Number of children: Not on file    Years of education: Not on file    Highest education level: Not on file   Occupational History    Not on file   Social Needs    Financial resource strain: Not on file    Food insecurity     Worry: Not on file     Inability: Not on file    Transportation needs     Medical: Not on file     Non-medical: Not on file   Tobacco Use    Smoking status: Current Every Day Smoker     Packs/day: 0.50     Years: 17.00     Pack years: 8.50     Types: Cigarettes    Smokeless tobacco: Never Used   Substance and Sexual Activity    Alcohol use: Yes     Frequency: 4 or more times a week     Drinks per session: 5 or 6     Binge frequency: Daily or almost daily    Drug use: No    Sexual activity: Yes     Partners: Male     Birth control/protection: None   Lifestyle    Physical activity     Days per week: Not on file     Minutes per session: Not on file    Stress: Not on file   Relationships    Social connections     Talks on phone: Not on file     Gets together: Not on file     Attends Jainism service: Not on file     Active member of club or organization: Not on file     Attends meetings of clubs or organizations: Not on file     Relationship status: Not on file    Intimate partner violence     Fear of current or ex partner: Not on file     Emotionally abused: Not on file     Physically abused: Not on file     Forced sexual activity: Not on file   Other Topics Concern    Not on file   Social History Narrative    Not on file     Family History   Problem Relation Age of Onset    Diabetes Father     Hypertension Paternal Grandfather          Allergies   Allergen Reactions    Latex Rash    Prednisone Nausea and Vomiting    Vicodin [Hydrocodone-Acetaminophen] Itching       Current Outpatient Medications   Medication Sig    ALPRAZolam (XANAX) 0.5 mg tablet Take 1 Tab by mouth three (3) times daily as needed.  raNITIdine (Zantac) 150 mg tablet Take 150 mg by mouth two (2) times a day.  citalopram (CELEXA) 20 mg tablet     cloNIDine HCL (CATAPRES) 0.2 mg tablet Take 1-2 Tabs by mouth nightly as needed.  dextroamphetamine-amphetamine (ADDERALL) 20 mg tablet Take 20 mg by mouth three (3) times daily.  levonorgestreL (MIRENA) 20 mcg/24 hours (6 yrs) 52 mg IUD by IntraUTERine route.  lamoTRIgine (LaMICtal) 200 mg tablet     acetaminophen (TYLENOL) 325 mg tablet Take 650 mg by mouth every four (4) hours as needed for Pain.  ibuprofen (MOTRIN) 600 mg tablet Take 600 mg by mouth every six (6) hours as needed for Pain. No current facility-administered medications for this visit. Review of Systems   Constitutional: Negative. Negative for chills, fever and malaise/fatigue. HENT: Negative. Negative for congestion, ear pain, sore throat and tinnitus. Eyes: Negative. Negative for blurred vision, double vision and photophobia. Respiratory: Negative. Negative for cough, shortness of breath and wheezing. Cardiovascular: Negative. Negative for chest pain, palpitations and leg swelling. Gastrointestinal: Positive for abdominal pain (epigastric discomfort) and nausea. Negative for blood in stool, constipation, diarrhea, heartburn, melena and vomiting. Genitourinary: Negative. Negative for dysuria, frequency, hematuria and urgency. Musculoskeletal: Negative. Negative for back pain, joint pain, myalgias and neck pain. Skin: Negative. Negative for itching and rash. Neurological: Negative. Negative for dizziness, tingling, tremors and headaches. Psychiatric/Behavioral: Positive for depression (controlled on meds). Negative for hallucinations, memory loss, substance abuse and suicidal ideas.  The patient is nervous/anxious (controlled on meds). The patient does not have insomnia. Visit Vitals  /67 (BP 1 Location: Left arm, BP Patient Position: Sitting, BP Cuff Size: Small adult)   Pulse 79   Temp 98.1 °F (36.7 °C) (Temporal)   Resp 17   Ht 5' 4.75\" (1.645 m)   Wt 143 lb (64.9 kg)   SpO2 98%   BMI 23.98 kg/m²       Physical Exam  Constitutional:       General: She is not in acute distress. Appearance: Normal appearance. She is not ill-appearing. HENT:      Head: Normocephalic and atraumatic. Cardiovascular:      Rate and Rhythm: Normal rate and regular rhythm. Pulses: Normal pulses. Heart sounds: Normal heart sounds. Pulmonary:      Effort: Pulmonary effort is normal.      Breath sounds: Normal breath sounds. Abdominal:      General: Bowel sounds are normal.      Tenderness: There is no abdominal tenderness. Skin:     General: Skin is warm and dry. Neurological:      Mental Status: She is alert and oriented to person, place, and time. Psychiatric:         Mood and Affect: Mood is anxious. Mood is not depressed. Speech: Speech normal.         Behavior: Behavior normal. Behavior is cooperative. Thought Content: Thought content normal. Thought content is not paranoid. Thought content does not include homicidal or suicidal ideation. ASSESSMENT and PLAN    ICD-10-CM ICD-9-CM    1. Epigastric discomfort  R10.13 789.06 REFERRAL TO GASTROENTEROLOGY      METABOLIC PANEL, COMPREHENSIVE      CBC WITH AUTOMATED DIFF      H. PYLORI BREATH TEST   2. HPV (human papilloma virus) anogenital infection  A63.0 079.4 REFERRAL TO GYNECOLOGY   3. Hx of abnormal cervical Pap smear  Z87.42 V13.29 REFERRAL TO GYNECOLOGY   4. IUD contraception  Z97.5 V45.51 REFERRAL TO GYNECOLOGY   5. Nausea  R11.0 787.02 REFERRAL TO GASTROENTEROLOGY      METABOLIC PANEL, COMPREHENSIVE   6.  Irritable bowel syndrome with both constipation and diarrhea  K58.2 564.1 REFERRAL TO GASTROENTEROLOGY      METABOLIC PANEL, COMPREHENSIVE VITAMIN D, 25 HYDROXY   7. Vitamin D deficiency  E55.9 268.9 VITAMIN D, 25 HYDROXY   8. Screening cholesterol level  Z13.220 V77.91 LIPID PANEL   9. Anxiety  F41.9 300.00 TSH 3RD GENERATION     Follow-up and Dispositions    · Return in about 2 weeks (around 4/13/2021) for labs. Pt expressed understanding of visit summary and plans for any follow ups or referrals as well as any medications prescribed.

## 2021-04-12 NOTE — PATIENT INSTRUCTIONS
Diet for Irritable Bowel Syndrome: Care Instructions  Your Care Instructions     Irritable bowel syndrome, or IBS, is a problem with the intestines. IBS can cause belly pain, bloating, gas, constipation, and diarrhea. Most people can control their symptoms by changing their diet and easing stress. No specific foods cause everyone with IBS to have symptoms. Many people find that they feel better by limiting or eliminating foods that may bring on symptoms. Make sure you don't stop eating all foods from any one food group without talking with a dietitian. You need to make sure you are still getting all the nutrients you need. Follow-up care is a key part of your treatment and safety. Be sure to make and go to all appointments, and call your doctor if you are having problems. It's also a good idea to know your test results and keep a list of the medicines you take. How can you care for yourself at home? To reduce constipation  · Include fruits, vegetables, beans, and whole grains in your diet each day. These foods are high in fiber. Slowly increase the amount of fiber you eat. This helps you avoid a lot of gas. · Drink plenty of fluids. If you have kidney, heart, or liver disease and have to limit fluids, talk with your doctor before you increase the amount of fluids you drink. · Get some exercise every day. Build up slowly to 30 to 60 minutes a day on 5 or more days of the week. · Take a fiber supplement, such as Citrucel or Metamucil, every day if needed. Read and follow all instructions on the label. · Schedule time each day for a bowel movement. Having a daily routine may help. Take your time and do not strain when having a bowel movement. · Check with your doctor before you increase the amount of fiber in your diet. For some people who have IBS, eating more fiber may make some symptoms worse. This includes bloating.   To reduce diarrhea  You may try giving up foods or drinks one at a time to see whether symptoms improve. Limit or avoid the following:  · Alcohol  · Caffeine, which is found in coffee, tea, cola drinks, and chocolate  · Nicotine, from smoking or chewing tobacco  · Gas-producing foods, such as beans, broccoli, cabbage, and apples  · Dairy products that contain lactose (milk sugar), such as ice cream and milk. · Foods and drinks high in sugar, especially fruit juice, soda, candy, and other packaged sweets (such as cookies)  · Foods high in fat, including sandy, sausage, butter, oils, and anything deep-fried  · Sorbitol and xylitol, artificial sweeteners found in some sugarless candies and chewing gum  Keep track of foods  · Some people with IBS use a daily food diary to keep track of what they eat and whether they have any symptoms after eating certain foods. The diary also can be a good way to record what is going on in your life. · Stress plays a role in IBS. So if you are aware that certain stresses bring on symptoms, you can try to reduce those stresses. Keep mealtimes pleasant  · Try to maintain a pleasant environment when you eat. This may reduce stress that can make symptoms likely to occur. · Give yourself plenty of time to eat, rather than eating on the go. Chew your food slowly. Try not to swallow air, which can cause bloating. Where can you learn more? Go to http://www.gray.com/  Enter P125 in the search box to learn more about \"Diet for Irritable Bowel Syndrome: Care Instructions. \"  Current as of: December 17, 2020               Content Version: 12.8  © 2006-2021 [employer]. Care instructions adapted under license by [employer] (which disclaims liability or warranty for this information). If you have questions about a medical condition or this instruction, always ask your healthcare professional. [address] any warranty or liability for your use of this information.